# Patient Record
Sex: MALE | Race: WHITE | Employment: OTHER | URBAN - METROPOLITAN AREA
[De-identification: names, ages, dates, MRNs, and addresses within clinical notes are randomized per-mention and may not be internally consistent; named-entity substitution may affect disease eponyms.]

---

## 2017-01-04 ENCOUNTER — LAB ONLY (OUTPATIENT)
Dept: ENDOCRINOLOGY | Age: 67
End: 2017-01-04

## 2017-01-04 VITALS — SYSTOLIC BLOOD PRESSURE: 124 MMHG | DIASTOLIC BLOOD PRESSURE: 79 MMHG | HEART RATE: 80 BPM

## 2017-01-16 ENCOUNTER — TELEPHONE (OUTPATIENT)
Dept: ENDOCRINOLOGY | Age: 67
End: 2017-01-16

## 2017-02-01 ENCOUNTER — LAB ONLY (OUTPATIENT)
Dept: ENDOCRINOLOGY | Age: 67
End: 2017-02-01

## 2017-02-01 VITALS — DIASTOLIC BLOOD PRESSURE: 90 MMHG | HEART RATE: 74 BPM | SYSTOLIC BLOOD PRESSURE: 130 MMHG

## 2017-03-13 ENCOUNTER — HOSPITAL ENCOUNTER (OUTPATIENT)
Dept: RADIATION THERAPY | Age: 67
Discharge: HOME | End: 2017-03-13

## 2017-11-22 ENCOUNTER — TELEPHONE (OUTPATIENT)
Dept: ONCOLOGY | Age: 67
End: 2017-11-22

## 2017-11-22 NOTE — TELEPHONE ENCOUNTER
I will also send a copy of this to his PCP, Dr. Sidney Waite. I described what is included in the survivorship care plan.  I also encouraged him to take it to his next appt with his radiation oncologist or his urologist for any questions he may have about it.  Also, encouraged him to contact me if he needs additional support, education or area resources, or has any follow-up questions.  He is agreeable to this plan.

## 2017-12-05 ENCOUNTER — DOCUMENTATION ONLY (OUTPATIENT)
Dept: ONCOLOGY | Age: 67
End: 2017-12-05

## 2017-12-05 NOTE — PROGRESS NOTES
[address] [place] [address], [place] [address].    Prostate Cancer Survivorship Care Plan    GENERAL INFORMATION    NAME/AGE/GENDER: Sandee Brito is a 75 y.o. male who was born on 1950. PHONE: [phone] Rosie Chu (TYE)    Date: 12/5/2017    Referring Provider: none    Patient Care Team:  Kameron Upton MD as PCP - General (Internal Medicine) [facility] [phone]  Hyun Tenorio MD as Physician (Radiation Oncology) [facility] [phone]  Oliver Kuhn MD as Physician (Urology) [facility] [phone]  Samir Whitmore NP as Nurse Practitioner (Cancer Survivorship) [facility] [phone]    DIAGNOSIS    Cancer type/histologic subtype: Prostate cancer/Adenocarcinoma    Goran Score: 4 + 3 = 7 in 1/12 cores; 3 + 4 = 7 in 1/12 cores    PSA at Diagnosis: 10.4 ng/mL on 1/11/17; 12.0 ng/mL on 11/29/16       Date of biopsy: 2/22/17    TNM: [id number]/Stage 2A    Risk grouping: Intermediate    FOLLOW-UP CARE PLAN    Cancer Surveillance or Other Recommended Related Tests        Name of test  When/How often Ordering Provider   PSA Every 3-6 months for the first 2 years, every 6 months for years 3-5, and annually after 5 years Dr. Suhas White and Dr. Anatoliy Navarro or his colleague   Digital Rectal Exam (ILANA) Every year Dr. Suhas White and Dr. Anatoliy Navarro or his colleague     Please continue to see your primary care provider for all general health care recommended for a man your age, including cancer screening tests.     Possible late and long-term effects that someone with this type of cancer and treatment may experience:  nocturia (nighttime urination), dysuria (painful or difficult urination), hematuria (blood in the urine), increased daytime urinary frequency, urinary obstruction/retention, urethral stricture formation, incontinence of urine or stool, acute and chronic cystitis (bladder inflammation), diarrhea, rectal damage/bleeding, acute and chronic proctitis (rectal inflammation), worsening and permanent erectile dysfunction, secondary cancers, and the potential need for supplemental treatment    Schedule of Clinical Visits        Coordinating Provider  When/How often Contact information   Primary Care Provider As needed for non-cancer health care [phone]     Radiation Oncologist As required by specific treatment plan [phone]     Urologist As required by specific treatment plan [phone]         CANCER TREATMENT SUMMARY    Surgery: Date and procedure/location/findings: No    External Beam Radiation: Yes, IMRT (intensity modulated radiation therapy)    Prostate/Seminal Vesicles Only: Yes End date (year): 17 to 17   Total dose/location: 7740 cGy to prostate and seminal vesicles   Whole Pelvis: No     Brachytherapy to Prostate: No     Systemic Therapy (chemotherapy, biologics, other): No    Persistent symptoms or side effects at completion of treatment: No     Clinical Trial: No     Date of other cancer and/or recurrence of primary cancer and subsequent treatment: none, according to documentation in [address] [place] [address] medical record    FAMILIAL CANCER RISK ASSESSMENT    Family history of cancer: your father had prostate cancer and is ; your mother and father both experienced skin cancer    Genetic/hereditary risk factor(s) or predisposing conditions: none known  Genetic testing: No     CONTINUING TREATMENT    Need for Ongoing (Adjuvant) Treatment for Cancer: No    DOING YOUR PART AS A CANCER SURVIVOR    Many survivors feel worried or anxious that the cancer will come back after treatment. While it often does not, its important to talk with your doctor about the possibility of the cancer returning.  Tell your doctor if you notice any of the following symptoms, as they may be signs of a cancer recurrence:     · Bone pain  · Chest pain  · Abdominal pain  · Shortness of breath or difficulty breathing  · Persistent headaches  · Persistent coughing    Or any other symptoms should be brought to the attention of your provider:   1. Anything that represents a brand new symptom   2. Anything that represents a persistent symptom   3. Anything you are worried about that might be related to the cancer coming back    Prostate Cancer Survivorship Guidelines:  1--Achieve/maintain a healthy weight by limiting consumption of high-calorie foods and beverages and increasing physical activity  2--Engage in at least 150 minutes/week of physical activity, which may include weight-bearing exercises  3--Eat a diet high in fruits, vegetables and whole grains and low in saturated fat  4--Take at least 600 units of vitamin D daily and have no more than 1200 mg of calcium in your daily diet  5--Limit alcohol intake to 2 servings/day or less  6--Quit or don't start to use any tobacco products  ACS Prostate Cancer Survivorship Care Guidelines July/August 2014    Cancer survivors may experience issues with the areas listed below. If you have any concerns in these or other areas, please speak with your survivorship nurse practitioner or a member of your physician team to find out how to get help with them. Anxiety or depression, emotional and mental health, fatigue, weight changes, stopping smoking, physical functioning, insurance, financial advice/assistance, school/work issues, parenting, memory or concentration loss, fertility, sexual functioning, or any other survivorship concerns            A number of lifestyle/behaviors can affect your ongoing health, including the risk for the cancer coming back or developing another cancer.   Discuss these recommendations with your doctor or nurse practitioner:    Tobacco use/cessation, diet, alcohol use, weight management (loss/gain), physical activity, sunscreen use, management of medications, management of other illnesses, or any other healthy living recommendations you'd like to discuss  [address]  Prostate Cancer Specific:  The Prostate [address] [facility] prostate. club  Prostate [address] [facility] [address] www.pcf.org  The Prostate Net www. prostate-online. org  Us Too www.ustoo. org  Zero The End of Prostate Cancer www.zerocancer. org  Prostate Cancer Survivorship InterviewAlert.dk  Long-term Care Guidelines for Prostate Cancer Survivors http://[employer]/. org/cancer/news/news/longterm-care-guidelines-for-prostate-cancer-survivors   Hormone Deprivation Symptoms in Men http://[employer]/. net/navigating-cancer-care/side-effects/hormone-deprivation-symptoms-men   The National Association for Continence 69 849 69 22  Wound Ostomy and Continence Nurses Society www.wocn. org    General:  [employer] www.livestrong. [facility] www.cancer. [address] www.cancer. org  American Society of Clinical Oncology http://[employer]/. net/research-and-advocacy/asco-care-and-treatment-  recommendations-patients/follow-care-breast-cancer  [facility] [employer] www.[employer]. org  Cancer and Careers www.cancerandcareers. [address] [place] [address]  Cancer Survivorship www.[employer]. [employer]cancersurvivorship    Prepared By: Charley JAMA with input from your radiation oncologist  [address] [place] [address]  [phone] or [phone]  [email or web address]    Delivered on: 12/5/17    This [address] [place] [address] is a cancer treatment summary and follow-up plan provided to you to keep with your healthcare records and to share with your healthcare providers. This summary is a brief record of major aspects of your cancer treatment, not a detailed or comprehensive record of your care.

## 2018-04-06 NOTE — PROGRESS NOTES
Patient aware of lab results. Patient came in for testosterone injection. Given 0.6 ml in right gluteal muscle of Testosterone cypionate 200 mg/ml. Patient tolerated injection well without complaints. Testosterone Cypionate 200 mg/ml Lot#[id number] 11/2017.  Sagrario Ramos

## 2018-07-30 LAB
CREATININE, EXTERNAL: 1.07
HBA1C MFR BLD HPLC: 6 %

## 2018-11-05 LAB — PSA, EXTERNAL: 0.56

## 2022-06-08 ENCOUNTER — TRANSCRIBE ORDER (OUTPATIENT)
Dept: SCHEDULING | Age: 72
End: 2022-06-08

## 2022-06-08 DIAGNOSIS — M47.26 OTHER SPONDYLOSIS WITH RADICULOPATHY, LUMBAR REGION: Primary | ICD-10-CM

## 2022-06-08 DIAGNOSIS — M51.36 DEGENERATION OF LUMBAR INTERVERTEBRAL DISC: ICD-10-CM

## 2022-06-09 ENCOUNTER — HOSPITAL ENCOUNTER (OUTPATIENT)
Dept: MRI IMAGING | Age: 72
Discharge: HOME | End: 2022-06-09
Attending: GENERAL PRACTICE
Payer: MEDICARE

## 2022-06-09 DIAGNOSIS — M47.26 OTHER SPONDYLOSIS WITH RADICULOPATHY, LUMBAR REGION: ICD-10-CM

## 2022-06-09 DIAGNOSIS — M51.36 DEGENERATION OF LUMBAR INTERVERTEBRAL DISC: ICD-10-CM

## 2022-06-09 PROCEDURE — 72148 MRI LUMBAR SPINE W/O DYE: CPT

## 2023-05-17 RX ORDER — ATORVASTATIN CALCIUM 10 MG/1
20 TABLET, FILM COATED ORAL DAILY
COMMUNITY

## 2023-05-17 RX ORDER — NAPROXEN 500 MG/1
500 TABLET ORAL PRN
COMMUNITY

## 2023-05-17 RX ORDER — SILDENAFIL CITRATE 20 MG/1
TABLET ORAL
COMMUNITY
Start: 2016-04-13

## 2023-05-17 RX ORDER — TESTOSTERONE CYPIONATE 200 MG/ML
120 INJECTION, SOLUTION INTRAMUSCULAR
COMMUNITY
Start: 2016-12-07

## 2023-05-17 RX ORDER — LISINOPRIL 10 MG/1
10 TABLET ORAL DAILY
COMMUNITY

## 2024-06-10 ENCOUNTER — HOSPITAL ENCOUNTER (OUTPATIENT)
Age: 74
Discharge: HOME | End: 2024-06-13

## 2024-06-10 DIAGNOSIS — M47.816 LUMBAR SPONDYLOSIS: ICD-10-CM

## 2024-06-11 ENCOUNTER — HOSPITAL ENCOUNTER (OUTPATIENT)
Age: 74
Discharge: HOME | End: 2024-06-14
Payer: MEDICARE

## 2024-06-11 PROCEDURE — 72148 MRI LUMBAR SPINE W/O DYE: CPT

## 2025-05-01 ENCOUNTER — TRANSCRIBE ORDERS (OUTPATIENT)
Facility: HOSPITAL | Age: 75
End: 2025-05-01

## 2025-05-01 DIAGNOSIS — M25.551 RIGHT HIP PAIN: Primary | ICD-10-CM

## 2025-05-12 ENCOUNTER — HOSPITAL ENCOUNTER (OUTPATIENT)
Facility: HOSPITAL | Age: 75
Discharge: HOME | End: 2025-05-15
Payer: MEDICARE

## 2025-05-12 DIAGNOSIS — M25.551 RIGHT HIP PAIN: ICD-10-CM

## 2025-05-12 PROCEDURE — 73721 MRI JNT OF LWR EXTRE W/O DYE: CPT
